# Patient Record
(demographics unavailable — no encounter records)

---

## 2025-06-13 NOTE — PLAN
[FreeTextEntry1] : He wishes to proceed with surgical procedure to aid his weight loss. He meets criteria per 2022 American Society for Metabolic and Bariatric Surgery (ASMBS) and International Federation for the Surgery of Obesity and Metabolic Disorders (IFSO): Indications for Metabolic and Bariatric Surgery.  I strongly feel this patient would significantly benefit from joining our Bariatric Surgery Program and undergoing Laparoscopic Sleeve Gastrectomy. I fear that failure to intervene surgically will result in continued weight gain and the progression of the patient's current co-morbidities including the probability of early death from obesity or related problems. The risks, benefits and alternatives of all the bariatric procedures were discussed in details. He had opportunity to ask questions and they were answered completely.    I have ordered the following evaluations that he will complete to prepare for weight loss surgery and address his medical issues: * Preoperative pulmonary clearance * Preoperative sleep study to assess for sleep apnea * Preoperative cardiac clearance. As the patient will be having major intraabdominal surgery, cardiac risk stratification is necessary to ensure patient safety for both surgery and anesthesia. Testing such as an echocardiogram, stress test, and stress echocardiogram are necessary to evaluate for possible coronary artery disease (CAD) or other cardiac conditions. * Preoperative lab screening including endocrine labs, thyroid function and vitamin labs * Preoperative nutritionist evaluation * Preoperative psychological evaluation * Preoperative endoscopy to assess his GERD symptoms and rule out presence of gastric pathology that would impact his procedure choice.  We also assess for the presence of a hiatal hernia that we would need to address at the time of surgery.  This is a critical component of the preoperative evaluation  Once these are completed he will return to the office for final evaluation and to be scheduled for Laparoscopic Sleeve Gastrectomy. His insurance provider will be contacted and we will begin the process of approval for surgery.   I have also counseled him regarding weight loss, diet and exercise. I explained to eat small frequent meals (eg. 3 meals a day without snacking), protein first in meals and then vegetables and not to drink liquids with meals.  Explained patient to chew well and take about 30 minutes per meal.  Avoid simple starches, liquid, pureed or blenderized foods.  I discussed balanced diet in regards to protein, carbohydrate, fat, vitamins and minerals.  I strongly recommended 1200 Johnathan/day meal plans. I went over exercise regimens of at least 20-30 minutes daily, including cardio and resistance training. I also explained that doing this life style changes should start in pre-operative period as soon as possible an maintained life long.  Following these will make him healthier and better fit for surgery too.   He will be encouraged to join on-going informational programs and support group meetings in pre-operative and post-operative settings, which we offer.   My discussion with the patient included but not limited to:  Various weight loss options (we discussed advantages and disadvantages of each approach): Non-surgical or Medical Weight Loss with or without Medications Intragastric Balloon Endoscopic Sleeve Gastroplasty Adjustable Gastric Banding Sleeve Gastrectomy Fernando-en-[id number] Gastric Bypass Duodenal Switch  I also informed the patient that asymptomatic or incidental hiatal hernia are very common with morbid obesity.  If confirmed to have hiatal hernia during surgery it will be repaired if deemed necessary.  However, if it is not deemed necessary surgery would be completed and once adequate weight loss has achieved separate hiatal hernia repair will be recommended if needed at time.  Also other incidental findings might warrant additional procedures like taking biopsies, repairing hernias or cutting adhesions.  I specifically discussed difference in out comes between all the approaches especially in regards to amount of weight loss, effect GERD, DM and other co-morbidities and risk of malnutrition and related nutritional diseases.  I also discussed need for stage 2 procedure especially if sleeve gastrectomy was done as stage 1 procedure.  We also discussed risks/complications of surgery, endoscopy and anesthesia including but not limited to: Infectious complications like UTI, pneumonia, wound infection and intra-abdominal infections Wound complications like infections, dehiscence and hernia formation and complications of scarring Complications of adhesions including obstructions Bleeding including life threatening bleeding requiring transfusion or repeat surgery Leak and peritonitis (~2%) Injury to internal organs including: esophagus, stomach, small and large intestine, spleen (requiring splenectomy), liver, pancreas, and major vessels. Need for open procedure and repeated procedures including requirement of endoscopies or interventional radiology procedures and requirements for drains. Need for alternative form on nutrition like TPN or feeding tube Need to transfer to tertiary care center for further care Cardiovascular complications including MI, Stroke, DVT/PE Pulmonary complications including edema and pneumonia Death (~1%) We discussed specifics related to bariatric surgery including but not limited to: Dumping syndrome Reflux Malnutrition Weight regain Unsatisfactory results Requirement for revision surgeries Perioperative contraception Risks of strictures, stenosis, internal hernias, marginal ulcers, etc. He understands well that: Not all excess weight will be lost Complete understanding of surgery as a "tool" concept Maintaining healthy life style Continuing diet and exercise for life long to sustain benefits from surgery Lifelong follow-up, nutritional evaluations and supplements He understands that his non-compliance may result in long-term severe complications including death. He also understands that surgery is not perfect science and un-foreseen complications or events can happen. After all these discussions and understanding he wishes to proceed with operation.  24/M with morbid obesity interested in sleeve gastrectomy -No weigh ins -Consultations -Sleep study -Labs -EGD   A total of 45 minutes was spent performing this encounter on this date of service. My evaluation of this patient, including a review of the chart, history, laboratory and imaging findings, discussion with the patient, placing orders and documenting the plan is detailed above.

## 2025-06-13 NOTE — HISTORY OF PRESENT ILLNESS
[de-identified] : Overweight all his life.  Tried diet, ozempic, and exercise without significant loss.  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  Interested in surgical options.

## 2025-06-13 NOTE — ASSESSMENT
[FreeTextEntry1] : This is a 24 year old male, who presents with clinically severe obesity with BMI of 44.26 and comorbidities directly related or exacerbated by his morbid obesity.  Previous attempts have failed to provide significant or sustained weight loss. He is at extremely high risk for all cause mortality based on the complications of his morbid obesity.